# Patient Record
Sex: MALE | Race: BLACK OR AFRICAN AMERICAN | NOT HISPANIC OR LATINO | URBAN - METROPOLITAN AREA
[De-identification: names, ages, dates, MRNs, and addresses within clinical notes are randomized per-mention and may not be internally consistent; named-entity substitution may affect disease eponyms.]

---

## 2020-07-25 ENCOUNTER — TELEPHONE ENCOUNTER (OUTPATIENT)
Dept: URBAN - METROPOLITAN AREA CLINIC 13 | Facility: CLINIC | Age: 60
End: 2020-07-25

## 2020-07-25 RX ORDER — POLYETHYLENE GLYCOL 3350, SODIUM CHLORIDE, SODIUM BICARBONATE AND POTASSIUM CHLORIDE WITH LEMON FLAVOR 420; 11.2; 5.72; 1.48 G/4L; G/4L; G/4L; G/4L
TAKE 1/2 GALLON AT 5:00 PM DAY BEFORE PROCEDURE, TAKE SECOND 1/2 OF GALLON 6 HRS PRIOR TO PROCEDURE POWDER, FOR SOLUTION ORAL
Qty: 1 | Refills: 0 | OUTPATIENT
Start: 2018-02-23 | End: 2018-03-16

## 2020-07-26 ENCOUNTER — TELEPHONE ENCOUNTER (OUTPATIENT)
Dept: URBAN - METROPOLITAN AREA CLINIC 13 | Facility: CLINIC | Age: 60
End: 2020-07-26

## 2020-07-26 RX ORDER — ASPIRIN 81 MG/1
TAKE 1 TABLET DAILY TABLET ORAL
Refills: 0 | Status: ACTIVE | COMMUNITY
Start: 2018-02-23

## 2020-07-26 RX ORDER — METFORMIN HYDROCHLORIDE 500 MG/1
TAKE 1 TABLET TWICE DAILY TABLET, COATED ORAL
Refills: 0 | Status: ACTIVE | COMMUNITY
Start: 2018-02-23

## 2020-07-26 RX ORDER — TADALAFIL 5 MG/1
TAKE 1 TABLET DAILY 1 HOUR BEFORE NEEDED TABLET, FILM COATED ORAL
Refills: 0 | Status: ACTIVE | COMMUNITY
Start: 2018-02-23

## 2020-07-26 RX ORDER — ATORVASTATIN CALCIUM 10 MG/1
TAKE 1 TABLET DAILY TABLET, FILM COATED ORAL
Refills: 0 | Status: ACTIVE | COMMUNITY
Start: 2018-02-23

## 2020-07-26 RX ORDER — LISINOPRIL 10 MG/1
TAKE 1 TABLET DAILY TABLET ORAL
Refills: 0 | Status: ACTIVE | COMMUNITY
Start: 2018-02-23

## 2021-10-21 ENCOUNTER — OFFICE VISIT (OUTPATIENT)
Dept: URBAN - METROPOLITAN AREA CLINIC 107 | Facility: CLINIC | Age: 61
End: 2021-10-21
Payer: COMMERCIAL

## 2021-10-21 ENCOUNTER — WEB ENCOUNTER (OUTPATIENT)
Dept: URBAN - METROPOLITAN AREA CLINIC 107 | Facility: CLINIC | Age: 61
End: 2021-10-21

## 2021-10-21 VITALS
WEIGHT: 211 LBS | BODY MASS INDEX: 31.25 KG/M2 | HEIGHT: 69 IN | TEMPERATURE: 98.2 F | HEART RATE: 86 BPM | RESPIRATION RATE: 18 BRPM | DIASTOLIC BLOOD PRESSURE: 78 MMHG | SYSTOLIC BLOOD PRESSURE: 129 MMHG

## 2021-10-21 DIAGNOSIS — R14.0 ABDOMINAL BLOATING: ICD-10-CM

## 2021-10-21 DIAGNOSIS — R14.3 EXCESSIVE GAS: ICD-10-CM

## 2021-10-21 DIAGNOSIS — R11.0 NAUSEA: ICD-10-CM

## 2021-10-21 DIAGNOSIS — R14.2 ERUCTATION: ICD-10-CM

## 2021-10-21 PROCEDURE — 99204 OFFICE O/P NEW MOD 45 MIN: CPT | Performed by: PHYSICIAN ASSISTANT

## 2021-10-21 RX ORDER — ASPIRIN 81 MG/1
TAKE 1 TABLET DAILY TABLET ORAL
Refills: 0 | Status: ACTIVE | COMMUNITY
Start: 2018-02-23

## 2021-10-21 RX ORDER — ATORVASTATIN CALCIUM 10 MG/1
TAKE 1 TABLET DAILY TABLET, FILM COATED ORAL
Refills: 0 | Status: ACTIVE | COMMUNITY
Start: 2018-02-23

## 2021-10-21 RX ORDER — LISINOPRIL 10 MG/1
TAKE 1 TABLET DAILY TABLET ORAL
Refills: 0 | Status: ACTIVE | COMMUNITY
Start: 2018-02-23

## 2021-10-21 RX ORDER — CARVEDILOL 6.25 MG/1
1 TABLET WITH FOOD TABLET, FILM COATED ORAL TWICE A DAY
Status: ACTIVE | COMMUNITY

## 2021-10-21 RX ORDER — METFORMIN HYDROCHLORIDE 500 MG/1
TAKE 1 TABLET TWICE DAILY TABLET, COATED ORAL
Refills: 0 | Status: ON HOLD | COMMUNITY
Start: 2018-02-23

## 2021-10-21 RX ORDER — TADALAFIL 5 MG/1
TAKE 1 TABLET DAILY 1 HOUR BEFORE NEEDED TABLET, FILM COATED ORAL
Refills: 0 | Status: ON HOLD | COMMUNITY
Start: 2018-02-23

## 2021-10-21 NOTE — HPI-TODAY'S VISIT:
Mr. Wharton is a 61-year-old gentleman with a history of adenomatous colon polyps, hypertension and hyperlipidemia, presenting with complaints of excessive gas, bloating, nausea and belching. He was last seen in 2018 for follow-up after undergoing colonoscopy.  This exam performed on 3/16/2018 was notable for the removal of 2 tubular adenomas which were less than 1 cm.  Repeat colonoscopy is recommended in 2023.  Hemorrhoid banding was also discussed at that time should his hemorrhoids become symptomatic. For the past month he reports persistent issues with belching and infrequent  bouts of nausea. He reportedly had a negative H. pylori breath test performed by his primary care provider.  His symptoms are typically exacerbated with drinking grape juice and carbonated sodas. He also endorses abdominal bloating and early satiety which have improved with dietary modifications and Gas-X as needed. Lately, he has become more aware of his dietary habits and is currently trying to identify foods that could exacerbating his symptoms.  Denies any vomiting, fevers or chills.  He states he had labs performed by his PCP about a month ago.  Unfortunately, we do not have these for review.  He reports a 15 pound intentional weight loss.  He denies any changes in his bowel habits.  No red blood per rectum, melena or hematochezia.  Denies any pain with defecation.

## 2021-11-19 ENCOUNTER — WEB ENCOUNTER (OUTPATIENT)
Dept: URBAN - METROPOLITAN AREA CLINIC 107 | Facility: CLINIC | Age: 61
End: 2021-11-19

## 2021-11-19 ENCOUNTER — OFFICE VISIT (OUTPATIENT)
Dept: URBAN - METROPOLITAN AREA CLINIC 107 | Facility: CLINIC | Age: 61
End: 2021-11-19
Payer: COMMERCIAL

## 2021-11-19 VITALS
SYSTOLIC BLOOD PRESSURE: 151 MMHG | BODY MASS INDEX: 30.96 KG/M2 | HEART RATE: 77 BPM | DIASTOLIC BLOOD PRESSURE: 91 MMHG | TEMPERATURE: 98.5 F | RESPIRATION RATE: 18 BRPM | WEIGHT: 209 LBS | HEIGHT: 69 IN

## 2021-11-19 DIAGNOSIS — R14.2 ERUCTATION: ICD-10-CM

## 2021-11-19 DIAGNOSIS — R11.0 NAUSEA: ICD-10-CM

## 2021-11-19 DIAGNOSIS — R14.0 ABDOMINAL BLOATING: ICD-10-CM

## 2021-11-19 DIAGNOSIS — R14.3 EXCESSIVE GAS: ICD-10-CM

## 2021-11-19 DIAGNOSIS — Z86.010 HISTORY OF COLON POLYPS: ICD-10-CM

## 2021-11-19 PROBLEM — 428283002: Status: ACTIVE | Noted: 2021-11-19

## 2021-11-19 PROCEDURE — 99213 OFFICE O/P EST LOW 20 MIN: CPT | Performed by: PHYSICIAN ASSISTANT

## 2021-11-19 RX ORDER — TADALAFIL 5 MG/1
TAKE 1 TABLET DAILY 1 HOUR BEFORE NEEDED TABLET, FILM COATED ORAL
Refills: 0 | Status: ON HOLD | COMMUNITY
Start: 2018-02-23

## 2021-11-19 RX ORDER — CARVEDILOL 6.25 MG/1
1 TABLET WITH FOOD TABLET, FILM COATED ORAL TWICE A DAY
Status: ACTIVE | COMMUNITY

## 2021-11-19 RX ORDER — ATORVASTATIN CALCIUM 10 MG/1
TAKE 1 TABLET DAILY TABLET, FILM COATED ORAL
Refills: 0 | Status: ACTIVE | COMMUNITY
Start: 2018-02-23

## 2021-11-19 RX ORDER — ASPIRIN 81 MG/1
TAKE 1 TABLET DAILY TABLET ORAL
Refills: 0 | Status: ACTIVE | COMMUNITY
Start: 2018-02-23

## 2021-11-19 RX ORDER — LISINOPRIL 10 MG/1
TAKE 1 TABLET DAILY TABLET ORAL
Refills: 0 | Status: ACTIVE | COMMUNITY
Start: 2018-02-23

## 2021-11-19 RX ORDER — METFORMIN HYDROCHLORIDE 500 MG/1
TAKE 1 TABLET TWICE DAILY TABLET, COATED ORAL
Refills: 0 | Status: ON HOLD | COMMUNITY
Start: 2018-02-23

## 2021-11-19 NOTE — HPI-OTHER HISTORIES
Colonoscopy (3/16/2018): [place] bowel preparation scale score of 9.  One 4 mm polyp in the descending colon.  Diverticulosis in the entire examined colon.  Nodular ileal mucosa.  Pathology demonstrated this polyp to be a tubular adenoma.  Terminal ileum biopsies revealed prominent mucosal lymphoid aggregates.

## 2022-05-06 ENCOUNTER — TELEPHONE ENCOUNTER (OUTPATIENT)
Dept: URBAN - METROPOLITAN AREA CLINIC 113 | Facility: CLINIC | Age: 62
End: 2022-05-06

## 2022-05-24 ENCOUNTER — OFFICE VISIT (OUTPATIENT)
Dept: URBAN - METROPOLITAN AREA SURGERY CENTER 25 | Facility: SURGERY CENTER | Age: 62
End: 2022-05-24

## 2023-02-08 ENCOUNTER — OFFICE VISIT (OUTPATIENT)
Dept: URBAN - METROPOLITAN AREA CLINIC 107 | Facility: CLINIC | Age: 63
End: 2023-02-08
Payer: COMMERCIAL

## 2023-02-08 VITALS
SYSTOLIC BLOOD PRESSURE: 146 MMHG | BODY MASS INDEX: 29.18 KG/M2 | TEMPERATURE: 97.8 F | DIASTOLIC BLOOD PRESSURE: 88 MMHG | HEIGHT: 69 IN | WEIGHT: 197 LBS | HEART RATE: 64 BPM

## 2023-02-08 DIAGNOSIS — R63.4 WEIGHT LOSS, UNINTENTIONAL: ICD-10-CM

## 2023-02-08 DIAGNOSIS — R19.4 CHANGE IN BOWEL HABITS: ICD-10-CM

## 2023-02-08 DIAGNOSIS — Z86.010 HISTORY OF ADENOMATOUS POLYP OF COLON: ICD-10-CM

## 2023-02-08 DIAGNOSIS — R19.7 DIARRHEA: ICD-10-CM

## 2023-02-08 PROCEDURE — 99214 OFFICE O/P EST MOD 30 MIN: CPT | Performed by: NURSE PRACTITIONER

## 2023-02-08 RX ORDER — CARVEDILOL 6.25 MG/1
1 TABLET WITH FOOD TABLET, FILM COATED ORAL TWICE A DAY
Status: ACTIVE | COMMUNITY

## 2023-02-08 RX ORDER — ATORVASTATIN CALCIUM 10 MG/1
TAKE 1 TABLET DAILY TABLET, FILM COATED ORAL
Refills: 0 | Status: ACTIVE | COMMUNITY
Start: 2018-02-23

## 2023-02-08 RX ORDER — LISINOPRIL 10 MG/1
TAKE 1 TABLET DAILY TABLET ORAL
Refills: 0 | Status: ACTIVE | COMMUNITY
Start: 2018-02-23

## 2023-02-08 RX ORDER — SODIUM, POTASSIUM,MAG SULFATES 17.5-3.13G
177ML AS DIRECTED SOLUTION, RECONSTITUTED, ORAL ORAL
Qty: 1 KIT | Refills: 0 | OUTPATIENT
Start: 2023-02-08 | End: 2023-02-10

## 2023-02-08 NOTE — HPI-OTHER HISTORIES
Colonoscopy (3/16/2018): [place] bowel preparation scale score of 9.  One 4 mm polyp in the descending colon.  Diverticulosis in the entire examined colon.  Nodular ileal mucosa.  Pathology demonstrated this polyp to be a tubular adenoma.  Terminal ileum biopsies revealed prominent mucosal lymphoid aggregates.

## 2023-02-10 PROBLEM — 429047008 HISTORY OF ADENOMATOUS POLYP OF COLON: Status: ACTIVE | Noted: 2023-02-10

## 2023-02-13 ENCOUNTER — WEB ENCOUNTER (OUTPATIENT)
Dept: URBAN - METROPOLITAN AREA CLINIC 113 | Facility: CLINIC | Age: 63
End: 2023-02-13

## 2023-02-17 ENCOUNTER — CLAIMS CREATED FROM THE CLAIM WINDOW (OUTPATIENT)
Dept: URBAN - METROPOLITAN AREA MEDICAL CENTER 43 | Facility: MEDICAL CENTER | Age: 63
End: 2023-02-17
Payer: COMMERCIAL

## 2023-02-17 ENCOUNTER — CLAIMS CREATED FROM THE CLAIM WINDOW (OUTPATIENT)
Dept: URBAN - METROPOLITAN AREA MEDICAL CENTER 43 | Facility: MEDICAL CENTER | Age: 63
End: 2023-02-17

## 2023-02-17 DIAGNOSIS — K51.011 ULCERATIVE PANCOLITIS WITH RECTAL BLEEDING: ICD-10-CM

## 2023-02-17 DIAGNOSIS — K63.3 APHTHOUS ULCER OF COLON: ICD-10-CM

## 2023-02-17 DIAGNOSIS — K57.30 ACQUIRED DIVERTICULOSIS OF COLON: ICD-10-CM

## 2023-02-17 DIAGNOSIS — K52.89 OTHER SPECIFIED NONINFECTIVE GASTROENTERITIS AND COLITIS: ICD-10-CM

## 2023-02-17 DIAGNOSIS — K64.1 BLEEDING GRADE II HEMORRHOIDS: ICD-10-CM

## 2023-02-17 DIAGNOSIS — K92.1 HEMATOCHEZIA: ICD-10-CM

## 2023-02-17 DIAGNOSIS — R19.7 DIARRHEA: ICD-10-CM

## 2023-02-17 PROBLEM — 444548001: Status: ACTIVE | Noted: 2023-02-17

## 2023-02-17 PROCEDURE — 45380 COLONOSCOPY AND BIOPSY: CPT | Performed by: INTERNAL MEDICINE

## 2023-02-17 RX ORDER — LISINOPRIL 10 MG/1
TAKE 1 TABLET DAILY TABLET ORAL
Refills: 0 | Status: ACTIVE | COMMUNITY
Start: 2018-02-23

## 2023-02-17 RX ORDER — ATORVASTATIN CALCIUM 10 MG/1
TAKE 1 TABLET DAILY TABLET, FILM COATED ORAL
Refills: 0 | Status: ACTIVE | COMMUNITY
Start: 2018-02-23

## 2023-02-17 RX ORDER — MESALAMINE 1.2 G/1
4 CAPSULES TABLET, DELAYED RELEASE ORAL ONCE A DAY
Qty: 360 CAPSULE | Refills: 1 | OUTPATIENT
Start: 2023-02-17 | End: 2023-08-16

## 2023-02-17 RX ORDER — CARVEDILOL 6.25 MG/1
1 TABLET WITH FOOD TABLET, FILM COATED ORAL TWICE A DAY
Status: ACTIVE | COMMUNITY

## 2023-02-20 ENCOUNTER — TELEPHONE ENCOUNTER (OUTPATIENT)
Dept: URBAN - METROPOLITAN AREA CLINIC 107 | Facility: CLINIC | Age: 63
End: 2023-02-20

## 2023-02-20 RX ORDER — PREDNISONE 10 MG/1
AS DIRECTED TABLET ORAL ONCE A DAY
Qty: 150 | Refills: 0 | OUTPATIENT
Start: 2023-02-21 | End: 2023-03-23

## 2023-02-22 ENCOUNTER — OFFICE VISIT (OUTPATIENT)
Dept: URBAN - METROPOLITAN AREA MEDICAL CENTER 2 | Facility: MEDICAL CENTER | Age: 63
End: 2023-02-22

## 2023-02-23 ENCOUNTER — OFFICE VISIT (OUTPATIENT)
Dept: URBAN - METROPOLITAN AREA MEDICAL CENTER 43 | Facility: MEDICAL CENTER | Age: 63
End: 2023-02-23

## 2023-03-06 PROBLEM — 398050005 DIVERTICULAR DISEASE OF COLON: Status: ACTIVE | Noted: 2023-03-06

## 2023-03-20 ENCOUNTER — OFFICE VISIT (OUTPATIENT)
Dept: URBAN - METROPOLITAN AREA CLINIC 107 | Facility: CLINIC | Age: 63
End: 2023-03-20
Payer: COMMERCIAL

## 2023-03-20 VITALS
SYSTOLIC BLOOD PRESSURE: 128 MMHG | WEIGHT: 193 LBS | HEART RATE: 81 BPM | TEMPERATURE: 96.7 F | BODY MASS INDEX: 28.58 KG/M2 | DIASTOLIC BLOOD PRESSURE: 74 MMHG | HEIGHT: 69 IN

## 2023-03-20 DIAGNOSIS — R19.7 DIARRHEA: ICD-10-CM

## 2023-03-20 DIAGNOSIS — R63.4 WEIGHT LOSS, UNINTENTIONAL: ICD-10-CM

## 2023-03-20 DIAGNOSIS — Z86.010 HISTORY OF ADENOMATOUS POLYP OF COLON: ICD-10-CM

## 2023-03-20 DIAGNOSIS — R14.2 ERUCTATION: ICD-10-CM

## 2023-03-20 DIAGNOSIS — R14.3 EXCESSIVE GAS: ICD-10-CM

## 2023-03-20 DIAGNOSIS — R14.0 ABDOMINAL BLOATING: ICD-10-CM

## 2023-03-20 DIAGNOSIS — K51.011 ULCERATIVE PANCOLITIS WITH RECTAL BLEEDING: ICD-10-CM

## 2023-03-20 DIAGNOSIS — R11.0 NAUSEA: ICD-10-CM

## 2023-03-20 PROCEDURE — 99214 OFFICE O/P EST MOD 30 MIN: CPT | Performed by: INTERNAL MEDICINE

## 2023-03-20 RX ORDER — CARVEDILOL 6.25 MG/1
1 TABLET WITH FOOD TABLET, FILM COATED ORAL TWICE A DAY
Status: ACTIVE | COMMUNITY

## 2023-03-20 RX ORDER — MESALAMINE 1.2 G/1
4 CAPSULES TABLET, DELAYED RELEASE ORAL ONCE A DAY
Qty: 360 CAPSULE | Refills: 1 | Status: ON HOLD | COMMUNITY
Start: 2023-02-17 | End: 2023-08-16

## 2023-03-20 RX ORDER — LISINOPRIL 10 MG/1
TAKE 1 TABLET DAILY TABLET ORAL
Refills: 0 | Status: ACTIVE | COMMUNITY
Start: 2018-02-23

## 2023-03-20 RX ORDER — ATORVASTATIN CALCIUM 10 MG/1
TAKE 1 TABLET DAILY TABLET, FILM COATED ORAL
Refills: 0 | Status: ACTIVE | COMMUNITY
Start: 2018-02-23

## 2023-03-20 RX ORDER — PREDNISONE 10 MG/1
AS DIRECTED TABLET ORAL ONCE A DAY
Qty: 150 | Refills: 0 | Status: ACTIVE | COMMUNITY
Start: 2023-02-21 | End: 2023-03-23

## 2023-03-20 NOTE — HPI-TODAY'S VISIT:
64 y/o male presenting for f/u. He was last seen in Feb 2023 and subsequently had a CSC performed in Feb 2023. He was found to have internal hemorrhoids and diverticula. He also had bleeding mucosa in the entire colon. 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 He was started on mesalamine but could not swallow these pills. He was then started on a short steroid taper. CT chest/abd/pelvis was unremarkable.   He still has diarrhea; which is intermittent. It occurs 1-5 per day. Sometimes it is more. Sometimes it may be 5-8 per day. It is better while on the steroids. He denies any rectal bleeding.   2/8/23 Mr. Wharton is a 63-year-old gentleman with a history of adenomatous colon polyps, hypertension, and hyperlipidemia, presenting to discuss colonoscopy. He was seen in the office in November 2021 for follow-up regarding abdominal bloating, nausea, excess gas and belching suspected to be related to functional dyspepsia.  His symptoms had improved with dietary modification and FDgard.  A colonoscopy was recommended for polyp surveillance in 2023. He had COVID-19 around [place]. Since this time, his bowel habits have changed. He complains of early satiety, stating he becomes full after eating one bite. He has unintentionally lost over 20lb in the last 6-8 weeks. He denies abdominal pain, but states anything he eats is running right through him. He has diarrhea all throughout the day with up to 6 episodes of nocturnal defecation which wakes him from sleep. He denies blood in the stool. At times, he will experience the urge to have a bowel movement without response. He has occasional nausea without vomiting. He had labs two weeks ago with his PCP. He has not had any stool studies or abdominal imaging.

## 2023-03-20 NOTE — HPI-OTHER HISTORIES
Colonoscopy (3/16/2018): [place] bowel preparation scale score of 9.  One 4 mm polyp in the descending colon.  Diverticulosis in the entire examined colon.  Nodular ileal mucosa.  Pathology demonstrated this polyp to be a tubular adenoma.  Terminal ileum biopsies revealed prominent mucosal lymphoid aggregates.

## 2023-04-04 ENCOUNTER — TELEPHONE ENCOUNTER (OUTPATIENT)
Dept: URBAN - METROPOLITAN AREA CLINIC 107 | Facility: CLINIC | Age: 63
End: 2023-04-04

## 2023-04-04 ENCOUNTER — OFFICE VISIT (OUTPATIENT)
Dept: URBAN - METROPOLITAN AREA CLINIC 107 | Facility: CLINIC | Age: 63
End: 2023-04-04

## 2023-04-04 ENCOUNTER — WEB ENCOUNTER (OUTPATIENT)
Dept: URBAN - METROPOLITAN AREA CLINIC 113 | Facility: CLINIC | Age: 63
End: 2023-04-04

## 2023-04-20 ENCOUNTER — OFFICE VISIT (OUTPATIENT)
Dept: URBAN - METROPOLITAN AREA MEDICAL CENTER 2 | Facility: MEDICAL CENTER | Age: 63
End: 2023-04-20
Payer: COMMERCIAL

## 2023-04-20 DIAGNOSIS — K22.2 ACQUIRED ESOPHAGEAL RING: ICD-10-CM

## 2023-04-20 DIAGNOSIS — K29.60 ADENOPAPILLOMATOSIS GASTRICA: ICD-10-CM

## 2023-04-20 DIAGNOSIS — K20.80 ESOPHAGITIS DISSECANS SUPERFICIALIS: ICD-10-CM

## 2023-04-20 PROCEDURE — 43239 EGD BIOPSY SINGLE/MULTIPLE: CPT | Performed by: INTERNAL MEDICINE

## 2023-04-20 RX ORDER — MESALAMINE 1.2 G/1
4 CAPSULES TABLET, DELAYED RELEASE ORAL ONCE A DAY
Qty: 360 CAPSULE | Refills: 1 | Status: ON HOLD | COMMUNITY
Start: 2023-02-17 | End: 2023-08-16

## 2023-04-20 RX ORDER — ATORVASTATIN CALCIUM 10 MG/1
TAKE 1 TABLET DAILY TABLET, FILM COATED ORAL
Refills: 0 | Status: ACTIVE | COMMUNITY
Start: 2018-02-23

## 2023-04-20 RX ORDER — LISINOPRIL 10 MG/1
TAKE 1 TABLET DAILY TABLET ORAL
Refills: 0 | Status: ACTIVE | COMMUNITY
Start: 2018-02-23

## 2023-04-20 RX ORDER — CARVEDILOL 6.25 MG/1
1 TABLET WITH FOOD TABLET, FILM COATED ORAL TWICE A DAY
Status: ACTIVE | COMMUNITY

## 2023-05-09 ENCOUNTER — TELEPHONE ENCOUNTER (OUTPATIENT)
Dept: URBAN - METROPOLITAN AREA CLINIC 107 | Facility: CLINIC | Age: 63
End: 2023-05-09

## 2023-05-15 ENCOUNTER — OFFICE VISIT (OUTPATIENT)
Dept: URBAN - METROPOLITAN AREA CLINIC 107 | Facility: CLINIC | Age: 63
End: 2023-05-15
Payer: COMMERCIAL

## 2023-05-15 VITALS
TEMPERATURE: 97.3 F | HEIGHT: 69 IN | WEIGHT: 194.8 LBS | HEART RATE: 86 BPM | RESPIRATION RATE: 18 BRPM | BODY MASS INDEX: 28.85 KG/M2 | SYSTOLIC BLOOD PRESSURE: 103 MMHG | DIASTOLIC BLOOD PRESSURE: 62 MMHG

## 2023-05-15 DIAGNOSIS — R19.7 DIARRHEA: ICD-10-CM

## 2023-05-15 DIAGNOSIS — Z86.010 HISTORY OF COLON POLYPS: ICD-10-CM

## 2023-05-15 DIAGNOSIS — K51.011 ULCERATIVE PANCOLITIS WITH RECTAL BLEEDING: ICD-10-CM

## 2023-05-15 DIAGNOSIS — R11.0 NAUSEA: ICD-10-CM

## 2023-05-15 PROCEDURE — 99214 OFFICE O/P EST MOD 30 MIN: CPT | Performed by: INTERNAL MEDICINE

## 2023-05-15 RX ORDER — ATORVASTATIN CALCIUM 10 MG/1
TAKE 1 TABLET DAILY TABLET, FILM COATED ORAL
Refills: 0 | Status: ACTIVE | COMMUNITY
Start: 2018-02-23

## 2023-05-15 RX ORDER — SODIUM, POTASSIUM,MAG SULFATES 17.5-3.13G
177ML SOLUTION, RECONSTITUTED, ORAL ORAL
Qty: 1 | OUTPATIENT
Start: 2023-05-15 | End: 2023-05-17

## 2023-05-15 RX ORDER — MESALAMINE 1.2 G/1
4 CAPSULES TABLET, DELAYED RELEASE ORAL ONCE A DAY
Qty: 360 CAPSULE | Refills: 1 | Status: ON HOLD | COMMUNITY
Start: 2023-02-17 | End: 2023-08-16

## 2023-05-15 RX ORDER — CARVEDILOL 6.25 MG/1
1 TABLET WITH FOOD TABLET, FILM COATED ORAL TWICE A DAY
Status: ACTIVE | COMMUNITY

## 2023-05-15 RX ORDER — LISINOPRIL 10 MG/1
TAKE 1 TABLET DAILY TABLET ORAL
Refills: 0 | Status: ACTIVE | COMMUNITY
Start: 2018-02-23

## 2023-05-15 NOTE — HPI-TODAY'S VISIT:
64 y/o male presenting for f/u. He was last seen in March 2023. He has a hx of dysphgia/GERD and was taken for an EGD. He was found to have LA-A esophagitis and a mild Schatzki's ring. Gastric bx were significant for chronic gastritis and negative for H.Pylori. esophageal bx were negative for BE.  Colitis on colonoscopy. Recommended to continue steroid taper. Will consider repeating his colonoscopy to demonstrate resolution of his colitis. He did finish steroid taper. He still has some loose stool. No rectal bleeding. He is not on any other medications currently; he could not tolerate swallowing them. He denies any rectal bleeding.   3/20/23 64 y/o male presenting for f/u. He was last seen in Feb 2023 and subsequently had a CSC performed in Feb 2023. He was found to have internal hemorrhoids and diverticula. He also had bleeding mucosa in the entire colon. 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 He was started on mesalamine but could not swallow these pills. He was then started on a short steroid taper. CT chest/abd/pelvis was unremarkable.   He still has diarrhea; which is intermittent. It occurs 1-5 per day. Sometimes it is more. Sometimes it may be 5-8 per day. It is better while on the steroids. He denies any rectal bleeding.   2/8/23 Mr. Wharton is a 63-year-old gentleman with a history of adenomatous colon polyps, hypertension, and hyperlipidemia, presenting to discuss colonoscopy. He was seen in the office in November 2021 for follow-up regarding abdominal bloating, nausea, excess gas and belching suspected to be related to functional dyspepsia.  His symptoms had improved with dietary modification and FDgard.  A colonoscopy was recommended for polyp surveillance in 2023. He had COVID-19 around [place]. Since this time, his bowel habits have changed. He complains of early satiety, stating he becomes full after eating one bite. He has unintentionally lost over 20lb in the last 6-8 weeks. He denies abdominal pain, but states anything he eats is running right through him. He has diarrhea all throughout the day with up to 6 episodes of nocturnal defecation which wakes him from sleep. He denies blood in the stool. At times, he will experience the urge to have a bowel movement without response. He has occasional nausea without vomiting. He had labs two weeks ago with his PCP. He has not had any stool studies or abdominal imaging.

## 2023-05-15 NOTE — HPI-OTHER HISTORIES
Colonoscopy (3/16/2018): [place] bowel preparation scale score of 9.  One 4 mm polyp in the descending colon.  Diverticulosis in the entire examined colon.  Nodular ileal mucosa.  Pathology demonstrated this polyp to be a tubular adenoma.  Terminal ileum biopsies revealed prominent mucosal lymphoid aggregates.

## 2023-07-07 ENCOUNTER — CLAIMS CREATED FROM THE CLAIM WINDOW (OUTPATIENT)
Dept: URBAN - METROPOLITAN AREA MEDICAL CENTER 43 | Facility: MEDICAL CENTER | Age: 63
End: 2023-07-07
Payer: COMMERCIAL

## 2023-07-07 ENCOUNTER — OFFICE VISIT (OUTPATIENT)
Dept: URBAN - METROPOLITAN AREA MEDICAL CENTER 43 | Facility: MEDICAL CENTER | Age: 63
End: 2023-07-07

## 2023-07-07 DIAGNOSIS — K64.1 GRADE II HEMORRHOIDS: ICD-10-CM

## 2023-07-07 DIAGNOSIS — K51.50 LEFT SIDED COLITIS: ICD-10-CM

## 2023-07-07 DIAGNOSIS — K57.30 ACQUIRED DIVERTICULOSIS OF COLON: ICD-10-CM

## 2023-07-07 PROCEDURE — 45380 COLONOSCOPY AND BIOPSY: CPT | Performed by: INTERNAL MEDICINE

## 2023-07-07 RX ORDER — ATORVASTATIN CALCIUM 10 MG/1
TAKE 1 TABLET DAILY TABLET, FILM COATED ORAL
Refills: 0 | Status: ACTIVE | COMMUNITY
Start: 2018-02-23

## 2023-07-07 RX ORDER — CARVEDILOL 6.25 MG/1
1 TABLET WITH FOOD TABLET, FILM COATED ORAL TWICE A DAY
Status: ACTIVE | COMMUNITY

## 2023-07-07 RX ORDER — LISINOPRIL 10 MG/1
TAKE 1 TABLET DAILY TABLET ORAL
Refills: 0 | Status: ACTIVE | COMMUNITY
Start: 2018-02-23

## 2023-07-07 RX ORDER — MESALAMINE 1.2 G/1
4 CAPSULES TABLET, DELAYED RELEASE ORAL ONCE A DAY
Qty: 360 CAPSULE | Refills: 1 | Status: ON HOLD | COMMUNITY
Start: 2023-02-17 | End: 2023-08-16

## 2023-08-18 ENCOUNTER — ERX REFILL RESPONSE (OUTPATIENT)
Dept: URBAN - METROPOLITAN AREA CLINIC 107 | Facility: CLINIC | Age: 63
End: 2023-08-18

## 2023-08-18 RX ORDER — MESALAMINE 1.2 G/1
TAKE 4 CAPSULES ORALLY ONCE A DAY FOR 90 DAYS TABLET, DELAYED RELEASE ORAL
Qty: 360 TABLET | Refills: 1 | OUTPATIENT

## 2023-08-18 RX ORDER — MESALAMINE 1.2 G/1
TAKE 4 CAPSULES ORALLY ONCE A DAY FOR 90 DAYS TABLET, DELAYED RELEASE ORAL
Qty: 360 TABLET | Refills: 2 | OUTPATIENT

## 2023-09-27 ENCOUNTER — OFFICE VISIT (OUTPATIENT)
Dept: URBAN - METROPOLITAN AREA CLINIC 107 | Facility: CLINIC | Age: 63
End: 2023-09-27
Payer: COMMERCIAL

## 2023-09-27 VITALS
SYSTOLIC BLOOD PRESSURE: 189 MMHG | DIASTOLIC BLOOD PRESSURE: 104 MMHG | HEIGHT: 69 IN | TEMPERATURE: 97.2 F | WEIGHT: 194 LBS | HEART RATE: 87 BPM | RESPIRATION RATE: 18 BRPM | BODY MASS INDEX: 28.73 KG/M2

## 2023-09-27 DIAGNOSIS — Z86.010 HISTORY OF ADENOMATOUS POLYP OF COLON: ICD-10-CM

## 2023-09-27 DIAGNOSIS — R14.0 ABDOMINAL BLOATING: ICD-10-CM

## 2023-09-27 DIAGNOSIS — R63.4 WEIGHT LOSS, UNINTENTIONAL: ICD-10-CM

## 2023-09-27 DIAGNOSIS — R19.7 DIARRHEA: ICD-10-CM

## 2023-09-27 DIAGNOSIS — R14.3 EXCESSIVE GAS: ICD-10-CM

## 2023-09-27 DIAGNOSIS — R14.2 ERUCTATION: ICD-10-CM

## 2023-09-27 DIAGNOSIS — R19.4 CHANGE IN BOWEL HABITS: ICD-10-CM

## 2023-09-27 DIAGNOSIS — R11.0 NAUSEA: ICD-10-CM

## 2023-09-27 DIAGNOSIS — K51.011 ULCERATIVE PANCOLITIS WITH RECTAL BLEEDING: ICD-10-CM

## 2023-09-27 PROCEDURE — 99213 OFFICE O/P EST LOW 20 MIN: CPT | Performed by: INTERNAL MEDICINE

## 2023-09-27 RX ORDER — ATORVASTATIN CALCIUM 10 MG/1
TAKE 1 TABLET DAILY TABLET, FILM COATED ORAL
Refills: 0 | Status: ACTIVE | COMMUNITY
Start: 2018-02-23

## 2023-09-27 RX ORDER — CARVEDILOL 6.25 MG/1
1 TABLET WITH FOOD TABLET, FILM COATED ORAL TWICE A DAY
Status: ACTIVE | COMMUNITY

## 2023-09-27 RX ORDER — MESALAMINE 1.2 G/1
TAKE 4 CAPSULES ORALLY ONCE A DAY FOR 90 DAYS TABLET, DELAYED RELEASE ORAL
Qty: 360 TABLET | Refills: 1 | Status: ACTIVE | COMMUNITY

## 2023-09-27 RX ORDER — LISINOPRIL 10 MG/1
TAKE 1 TABLET DAILY TABLET ORAL
Refills: 0 | Status: ACTIVE | COMMUNITY
Start: 2018-02-23

## 2023-09-27 NOTE — HPI-TODAY'S VISIT:
62 y/o male presenting for f/u. He was last seen in May 2023. He has a hx of GERD and esophagitis. He had a CSC and was found to have colitis. WE recommended to repeat his CSC which was done in July 2023. Random bx were negative and his CSC was normal without any colitis. He denies any rectal bleeding or pain. He does have intermittent diarrhea which occurrs with certain foods. It may happen once weekly. He has more good days than bad.   5/15/23 62 y/o male presenting for f/u. He was last seen in March 2023. He has a hx of dysphgia/GERD and was taken for an EGD. He was found to have LA-A esophagitis and a mild Schatzki's ring. Gastric bx were significant for chronic gastritis and negative for H.Pylori. esophageal bx were negative for BE.  Colitis on colonoscopy. Recommended to continue steroid taper. Will consider repeating his colonoscopy to demonstrate resolution of his colitis. He did finish steroid taper. He still has some loose stool. No rectal bleeding. He is not on any other medications currently; he could not tolerate swallowing them. He denies any rectal bleeding.   3/20/23 62 y/o male presenting for f/u. He was last seen in Feb 2023 and subsequently had a CSC performed in Feb 2023. He was found to have internal hemorrhoids and diverticula. He also had bleeding mucosa in the entire colon. 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 He was started on mesalamine but could not swallow these pills. He was then started on a short steroid taper. CT chest/abd/pelvis was unremarkable.   He still has diarrhea; which is intermittent. It occurs 1-5 per day. Sometimes it is more. Sometimes it may be 5-8 per day. It is better while on the steroids. He denies any rectal bleeding.   2/8/23 Mr. Wharton is a 63-year-old gentleman with a history of adenomatous colon polyps, hypertension, and hyperlipidemia, presenting to discuss colonoscopy. He was seen in the office in November 2021 for follow-up regarding abdominal bloating, nausea, excess gas and belching suspected to be related to functional dyspepsia.  His symptoms had improved with dietary modification and FDgard.  A colonoscopy was recommended for polyp surveillance in 2023. He had COVID-19 around [place]. Since this time, his bowel habits have changed. He complains of early satiety, stating he becomes full after eating one bite. He has unintentionally lost over 20lb in the last 6-8 weeks. He denies abdominal pain, but states anything he eats is running right through him. He has diarrhea all throughout the day with up to 6 episodes of nocturnal defecation which wakes him from sleep. He denies blood in the stool. At times, he will experience the urge to have a bowel movement without response. He has occasional nausea without vomiting. He had labs two weeks ago with his PCP. He has not had any stool studies or abdominal imaging.

## 2023-09-27 NOTE — HPI-OTHER HISTORIES
Colonoscopy (3/16/2018): [place] bowel preparation scale score of 9.  One 4 mm polyp in the descending colon.  Diverticulosis in the entire examined colon.  Nodular ileal mucosa.  Pathology demonstrated this polyp to be a tubular adenoma.  Terminal ileum biopsies revealed prominent mucosal lymphoid aggregates.

## 2024-02-14 ENCOUNTER — OV EP (OUTPATIENT)
Dept: URBAN - METROPOLITAN AREA CLINIC 113 | Facility: CLINIC | Age: 64
End: 2024-02-14

## 2024-02-14 RX ORDER — CARVEDILOL 6.25 MG/1
1 TABLET WITH FOOD TABLET, FILM COATED ORAL TWICE A DAY
Status: ACTIVE | COMMUNITY

## 2024-02-14 RX ORDER — LISINOPRIL 10 MG/1
TAKE 1 TABLET DAILY TABLET ORAL
Refills: 0 | Status: ACTIVE | COMMUNITY
Start: 2018-02-23

## 2024-02-14 RX ORDER — MESALAMINE 1.2 G/1
TAKE 4 CAPSULES ORALLY ONCE A DAY FOR 90 DAYS TABLET, DELAYED RELEASE ORAL
Qty: 360 TABLET | Refills: 1 | Status: ACTIVE | COMMUNITY

## 2024-02-14 RX ORDER — ATORVASTATIN CALCIUM 10 MG/1
TAKE 1 TABLET DAILY TABLET, FILM COATED ORAL
Refills: 0 | Status: ACTIVE | COMMUNITY
Start: 2018-02-23

## 2024-03-08 ENCOUNTER — OV EP (OUTPATIENT)
Dept: URBAN - METROPOLITAN AREA CLINIC 107 | Facility: CLINIC | Age: 64
End: 2024-03-08

## 2024-04-12 ENCOUNTER — OV EP (OUTPATIENT)
Dept: URBAN - METROPOLITAN AREA CLINIC 107 | Facility: CLINIC | Age: 64
End: 2024-04-12

## 2024-05-07 ENCOUNTER — CLAIMS CREATED FROM THE CLAIM WINDOW (OUTPATIENT)
Dept: URBAN - METROPOLITAN AREA CLINIC 107 | Facility: CLINIC | Age: 64
End: 2024-05-07

## 2024-05-07 ENCOUNTER — OFFICE VISIT (OUTPATIENT)
Dept: URBAN - METROPOLITAN AREA CLINIC 107 | Facility: CLINIC | Age: 64
End: 2024-05-07
Payer: COMMERCIAL

## 2024-05-07 ENCOUNTER — DASHBOARD ENCOUNTERS (OUTPATIENT)
Age: 64
End: 2024-05-07

## 2024-05-07 VITALS
HEART RATE: 61 BPM | BODY MASS INDEX: 28.88 KG/M2 | SYSTOLIC BLOOD PRESSURE: 136 MMHG | TEMPERATURE: 97.9 F | WEIGHT: 195 LBS | DIASTOLIC BLOOD PRESSURE: 93 MMHG | HEIGHT: 69 IN

## 2024-05-07 DIAGNOSIS — R14.3 EXCESSIVE GAS: ICD-10-CM

## 2024-05-07 DIAGNOSIS — R19.7 INTERMITTENT DIARRHEA: ICD-10-CM

## 2024-05-07 DIAGNOSIS — R14.0 ABDOMINAL BLOATING: ICD-10-CM

## 2024-05-07 DIAGNOSIS — K51.00 ULCERATIVE PANCOLITIS: ICD-10-CM

## 2024-05-07 PROBLEM — 444548001: Status: ACTIVE | Noted: 2024-05-07

## 2024-05-07 PROCEDURE — 99214 OFFICE O/P EST MOD 30 MIN: CPT | Performed by: NURSE PRACTITIONER

## 2024-05-07 RX ORDER — LISINOPRIL 10 MG/1
TAKE 1 TABLET DAILY TABLET ORAL
Refills: 0 | Status: ACTIVE | COMMUNITY
Start: 2018-02-23

## 2024-05-07 RX ORDER — CARVEDILOL 6.25 MG/1
1 TABLET WITH FOOD TABLET, FILM COATED ORAL TWICE A DAY
Status: ACTIVE | COMMUNITY

## 2024-05-07 RX ORDER — MESALAMINE 1.2 G/1
TAKE 4 CAPSULES ORALLY ONCE A DAY FOR 90 DAYS TABLET, DELAYED RELEASE ORAL
Qty: 360 TABLET | Refills: 1 | Status: ON HOLD | COMMUNITY

## 2024-05-07 RX ORDER — ATORVASTATIN CALCIUM 10 MG/1
TAKE 1 TABLET DAILY TABLET, FILM COATED ORAL
Refills: 0 | Status: ACTIVE | COMMUNITY
Start: 2018-02-23

## 2024-08-07 ENCOUNTER — OFFICE VISIT (OUTPATIENT)
Dept: URBAN - METROPOLITAN AREA CLINIC 107 | Facility: CLINIC | Age: 64
End: 2024-08-07

## 2024-08-16 ENCOUNTER — OFFICE VISIT (OUTPATIENT)
Dept: URBAN - METROPOLITAN AREA CLINIC 107 | Facility: CLINIC | Age: 64
End: 2024-08-16
Payer: COMMERCIAL

## 2024-08-16 VITALS
DIASTOLIC BLOOD PRESSURE: 98 MMHG | TEMPERATURE: 97.2 F | HEIGHT: 69 IN | WEIGHT: 194.2 LBS | OXYGEN SATURATION: 99 % | BODY MASS INDEX: 28.76 KG/M2 | SYSTOLIC BLOOD PRESSURE: 131 MMHG | HEART RATE: 74 BPM

## 2024-08-16 DIAGNOSIS — R19.7 INTERMITTENT DIARRHEA: ICD-10-CM

## 2024-08-16 DIAGNOSIS — R14.3 EXCESSIVE GAS: ICD-10-CM

## 2024-08-16 DIAGNOSIS — K51.00 ULCERATIVE PANCOLITIS: ICD-10-CM

## 2024-08-16 DIAGNOSIS — R14.0 ABDOMINAL BLOATING: ICD-10-CM

## 2024-08-16 PROCEDURE — 99214 OFFICE O/P EST MOD 30 MIN: CPT | Performed by: NURSE PRACTITIONER

## 2024-08-16 RX ORDER — FAMOTIDINE 40 MG/1
1 TABLET 30 MINUTES BEFORE BREAKFAST AND DINNER TABLET, FILM COATED ORAL TWICE A DAY
Qty: 180 TABLET | Refills: 1 | OUTPATIENT
Start: 2024-08-16

## 2024-08-16 RX ORDER — CARVEDILOL 6.25 MG/1
1 TABLET WITH FOOD TABLET, FILM COATED ORAL TWICE A DAY
Status: ACTIVE | COMMUNITY

## 2024-08-16 RX ORDER — ATORVASTATIN CALCIUM 10 MG/1
TAKE 1 TABLET DAILY TABLET, FILM COATED ORAL
Refills: 0 | Status: ACTIVE | COMMUNITY
Start: 2018-02-23

## 2024-08-16 RX ORDER — MESALAMINE 1.2 G/1
TAKE 4 CAPSULES ORALLY ONCE A DAY FOR 90 DAYS TABLET, DELAYED RELEASE ORAL
Qty: 360 TABLET | Refills: 1 | Status: ON HOLD | COMMUNITY

## 2024-08-16 RX ORDER — LISINOPRIL 10 MG/1
TAKE 1 TABLET DAILY TABLET ORAL
Refills: 0 | Status: ACTIVE | COMMUNITY
Start: 2018-02-23

## 2024-08-16 NOTE — HPI-OTHER HISTORIES
Colonoscopy (3/16/2018): [place] bowel preparation scale score of 9.  One 4 mm polyp in the descending colon.  Diverticulosis in the entire examined colon.  Nodular ileal mucosa.  Pathology demonstrated this polyp to be a tubular adenoma.  Terminal ileum biopsies revealed prominent mucosal lymphoid aggregates. EGD (4/20/23). He was found to have LA-A esophagitis and a mild Schatzki's ring. Gastric bx were significant for chronic gastritis and negative for H.Pylori. esophageal bx were negative for BE.  Colonoscopy (July 2023). Random bx were negative and his CSC was normal without any colitis.  Labs 2/18/2024. CBC:Hgb 12.7, HCT 37.4, RBC 4.01. CMP: Glucose 122, alk phos 39. Hemoglobin A1c 6.7.

## 2024-08-16 NOTE — HPI-TODAY'S VISIT:
64-year-old male here for a follow-up appointment.  8/16/2024 Today he is doing better.  Butter causes loose stools so he has cut back. Occasional bloating. Insurance doesn't cover his breath tests.  He has not been on the mesalamine since his colonoscopy in 2023, pills were too big and he was told he didn't have to take it.  On famotidine which has helped his GERD. No dysphagia. Has fomred BM 3 times a day, no melena, hematochezia or mucous.  Weight is stable from his last appointment  Last seen 5/7/2024 for ulcerative pancolitis with excessive gas, intermittent diarrhea, bloating and adenomatous polyps.  Advised to try Pepcid.  Breath test ordered for further evaluation.  Colitis in remission on mesalamine.  Stool studies recommended if diarrhea persist he was advised to call and notify us.  FODMAP diet advised to identify food triggers Benefiber to bulk stools. Occasional loose stools. Has increased gas, both flatulence and belching,  Had 2 BM's yesterday that were formed. Somtimes more. Stools will be oily at times.  No mucous, no hematochezia.  He does think he is lactose intolerant.  No abdominal pain or GERD. Denies dysphagia. Weight is stable from his last appointment.    9/27/23 64 y/o male presenting for f/u. He was last seen in May 2023. He has a hx of GERD and esophagitis. He had a CSC and was found to have colitis. We recommended to repeat his CSC which was done in July 2023. Random bx were negative and his CSC was normal without any colitis. He denies any rectal bleeding or pain. He does have intermittent diarrhea which occurrs with certain foods. It may happen once weekly. He has more good days than bad.   ulcerative pancolitis in remission. GERD symptoms resolved PPI was titrated down to once a day.  5/15/23 64 y/o male presenting for f/u. He was last seen in March 2023. He has a hx of dysphgia/GERD and was taken for an EGD. He was found to have LA-A esophagitis and a mild Schatzki's ring. Gastric bx were significant for chronic gastritis and negative for H.Pylori. esophageal bx were negative for BE.  Colitis on colonoscopy. Recommended to continue steroid taper. Will consider repeating his colonoscopy to demonstrate resolution of his colitis. He did finish steroid taper. He still has some loose stool. No rectal bleeding. He is not on any other medications currently; he could not tolerate swallowing them. He denies any rectal bleeding.   3/20/23 64 y/o male presenting for f/u. He was last seen in Feb 2023 and subsequently had a CSC performed in Feb 2023. He was found to have internal hemorrhoids and diverticula. He also had bleeding mucosa in the entire colon. 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 He was started on mesalamine but could not swallow these pills. He was then started on a short steroid taper. CT chest/abd/pelvis was unremarkable.   He still has diarrhea; which is intermittent. It occurs 1-5 per day. Sometimes it is more. Sometimes it may be 5-8 per day. It is better while on the steroids. He denies any rectal bleeding.   2/8/23 Mr. Wharton is a 63-year-old gentleman with a history of adenomatous colon polyps, hypertension, and hyperlipidemia, presenting to discuss colonoscopy. He was seen in the office in November 2021 for follow-up regarding abdominal bloating, nausea, excess gas and belching suspected to be related to functional dyspepsia.  His symptoms had improved with dietary modification and FDgard.  A colonoscopy was recommended for polyp surveillance in 2023. He had COVID-19 around [place]. Since this time, his bowel habits have changed. He complains of early satiety, stating he becomes full after eating one bite. He has unintentionally lost over 20lb in the last 6-8 weeks. He denies abdominal pain, but states anything he eats is running right through him. He has diarrhea all throughout the day with up to 6 episodes of nocturnal defecation which wakes him from sleep. He denies blood in the stool. At times, he will experience the urge to have a bowel movement without response. He has occasional nausea without vomiting. He had labs two weeks ago with his PCP. He has not had any stool studies or abdominal imaging.

## 2025-02-12 ENCOUNTER — ERX REFILL RESPONSE (OUTPATIENT)
Dept: URBAN - METROPOLITAN AREA CLINIC 72 | Facility: CLINIC | Age: 65
End: 2025-02-12

## 2025-02-12 RX ORDER — FAMOTIDINE 40 MG/1
1 TABLET 30 MINUTES BEFORE BREAKFAST AND DINNER TABLET, FILM COATED ORAL TWICE A DAY
Qty: 180 TABLET | Refills: 1 | OUTPATIENT

## 2025-02-12 RX ORDER — FAMOTIDINE 40 MG/1
1 TABLET 30 MINUTES BEFORE BREAKFAST AND DINNER TWICE A DAY TABLET, FILM COATED ORAL
Qty: 180 TABLET | Refills: 1 | OUTPATIENT

## 2025-02-17 ENCOUNTER — LAB OUTSIDE AN ENCOUNTER (OUTPATIENT)
Dept: URBAN - METROPOLITAN AREA CLINIC 107 | Facility: CLINIC | Age: 65
End: 2025-02-17

## 2025-02-17 ENCOUNTER — OFFICE VISIT (OUTPATIENT)
Dept: URBAN - METROPOLITAN AREA CLINIC 107 | Facility: CLINIC | Age: 65
End: 2025-02-17
Payer: COMMERCIAL

## 2025-02-17 VITALS
WEIGHT: 191 LBS | DIASTOLIC BLOOD PRESSURE: 98 MMHG | HEIGHT: 69 IN | HEART RATE: 59 BPM | SYSTOLIC BLOOD PRESSURE: 167 MMHG | BODY MASS INDEX: 28.29 KG/M2 | TEMPERATURE: 97.7 F

## 2025-02-17 DIAGNOSIS — R63.0 DECREASED APPETITE: ICD-10-CM

## 2025-02-17 DIAGNOSIS — Z86.0101 HISTORY OF ADENOMATOUS POLYP OF COLON: ICD-10-CM

## 2025-02-17 DIAGNOSIS — R14.3 EXCESSIVE GAS: ICD-10-CM

## 2025-02-17 DIAGNOSIS — R14.0 ABDOMINAL BLOATING: ICD-10-CM

## 2025-02-17 DIAGNOSIS — R63.4 UNINTENTIONAL WEIGHT LOSS: ICD-10-CM

## 2025-02-17 DIAGNOSIS — K51.00 ULCERATIVE PANCOLITIS: ICD-10-CM

## 2025-02-17 DIAGNOSIS — R19.7 INTERMITTENT DIARRHEA: ICD-10-CM

## 2025-02-17 PROBLEM — 64379006: Status: ACTIVE | Noted: 2025-02-17

## 2025-02-17 PROCEDURE — 99214 OFFICE O/P EST MOD 30 MIN: CPT | Performed by: NURSE PRACTITIONER

## 2025-02-17 RX ORDER — FAMOTIDINE 40 MG/1
1 TABLET 30 MINUTES BEFORE BREAKFAST AND DINNER TWICE A DAY TABLET, FILM COATED ORAL
Qty: 180 TABLET | Refills: 1 | Status: ACTIVE | COMMUNITY

## 2025-02-17 RX ORDER — MESALAMINE 1.2 G/1
TAKE 4 CAPSULES ORALLY ONCE A DAY FOR 90 DAYS TABLET, DELAYED RELEASE ORAL
Qty: 360 TABLET | Refills: 1 | Status: ON HOLD | COMMUNITY

## 2025-02-17 RX ORDER — ATORVASTATIN CALCIUM 10 MG/1
TAKE 1 TABLET DAILY TABLET, FILM COATED ORAL
Refills: 0 | Status: ACTIVE | COMMUNITY
Start: 2018-02-23

## 2025-02-17 RX ORDER — MEGESTROL ACETATE 20 MG/1
1 TABLET TABLET ORAL ONCE A DAY
Qty: 30 TABLET | Refills: 3 | OUTPATIENT
Start: 2025-02-17 | End: 2025-06-17

## 2025-02-17 RX ORDER — LISINOPRIL 10 MG/1
TAKE 1 TABLET DAILY TABLET ORAL
Refills: 0 | Status: ACTIVE | COMMUNITY
Start: 2018-02-23

## 2025-02-17 RX ORDER — CARVEDILOL 6.25 MG/1
1 TABLET WITH FOOD TABLET, FILM COATED ORAL TWICE A DAY
Status: ACTIVE | COMMUNITY

## 2025-02-17 NOTE — HPI-TODAY'S VISIT:
64-year-old male here for a follow-up appointment.  Last seen 8/16/2024 for excessive gas was given a free of sucrose intolerance test completed bloating persists. Intermittent diarrhea improved continue fiber low FODMAP diet to identify triggers. If diarrhea returns advised notify us we will order stool studies. Started on famotidine 40 mg twice a day consider treatment for SIBO.  Seen 5/7/2024 for ulcerative pancolitis with excessive gas, intermittent diarrhea, bloating and adenomatous polyps.  Advised to try Pepcid.  Breath test ordered for further evaluation.  Colitis in remission on mesalamine.  Stool studies recommended if diarrhea persist he was advised to call and notify us.  FODMAP diet advised to identify food triggers Benefiber to bulk stools. Occasional loose stools. Has increased gas, both flatulence and belching,  Had 2 BM's yesterday that were formed. Sometimes more. Stools will be oily at times.  No mucous, no hematochezia.  He does think he is lactose intolerant.  No abdominal pain or GERD. Denies dysphagia. Weight is stable from his last appointment.    9/27/23 64 y/o male presenting for f/u. He was last seen in May 2023. He has a Hx of GERD and esophagitis. He had a CSC and was found to have colitis. We recommended repeating his CSC which was done in July 2023. Random bx were negative and his CSC was normal without any colitis. He denies any rectal bleeding or pain. He does have intermittent diarrhea which occurs with certain foods. It may happen once weekly. He has more good days than bad.   Ulcerative pancolitis in remission. GERD symptoms resolved PPI was titrated down to once a day.  5/15/23 64 y/o male presenting for f/u. He was last seen in March 2023. He has a Hx of dysphagia/GERD and was taken for an EGD. He was found to have LA-A esophagitis and a mild Schatzki's ring. Gastric bx were significant for chronic gastritis and negative for H.Pylori. Esophageal bx were negative for BE.  Colitis on colonoscopy. Recommended continuing steroid taper. Will consider repeating his colonoscopy to demonstrate resolution of his colitis. He did finish steroid taper. He still has some loose stool. No rectal bleeding. He is not on any other medications currently; he could not tolerate swallowing them. He denies any rectal bleeding.   3/20/23 64 y/o male presenting for f/u. He was last seen in Feb 2023 and subsequently had a CSC performed in Feb 2023. He was found to have internal hemorrhoids and diverticula. He also had bleeding mucosa in the entire colon. 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 He was started on mesalamine but could not swallow these pills. He was then started on a short steroid taper. CT chest/abd/pelvis was unremarkable.   He still has diarrhea; which is intermittent. It occurs 1-5 per day. Sometimes it is more. Sometimes it may be 5-8 per day. It is better while on the steroids. He denies any rectal bleeding.   2/8/23 Mr. Wharton is a 63-year-old gentleman with a history of adenomatous colon polyps, hypertension, and hyperlipidemia, presenting to discuss colonoscopy. He was seen in the office in November 2021 for follow-up regarding abdominal bloating, nausea, excess gas and belching suspected to be related to functional dyspepsia.  His symptoms had improved with dietary modification and FDgard.  A colonoscopy was recommended for polyp surveillance in 2023. He had COVID-19 around [employer]. Since this time, his bowel habits have changed. He complains of early satiety, stating he becomes full after eating one bite. He has unintentionally lost over 20lb in the last 6-8 weeks. He denies abdominal pain, but states anything he eats is running right through him. He has diarrhea all throughout the day with up to 6 episodes of nocturnal defecation which wakes him from sleep. He denies blood in the stool. At times, he will experience the urge to have a bowel movement without response. He has occasional nausea without vomiting. He had labs two weeks ago with his PCP. He has not had any stool studies or abdominal imaging.  8/16/2024 Today he is doing better. Butter causes loose stools so he has cut back. Occasional bloating. Insurance doesn't cover his breath tests. He has not been on the mesalamine since his colonoscopy in 2023, pills were too big, and he was told he didn't have to take it. On famotidine which has helped his GERD. No dysphagia. Has formed BM 3 times a day, no melena, hematochezia or mucous. Weight is stable from his last appointment  Interval history, 2/17/2025 Taking famotidine, still gassy but better. Stomach is growling, but he didn't have breakfast, only eats 2 meals a day.  Decreased appetite, tried smoothies. Feels a little nausea. Has 5 watery/oily BM's a day. If he takes 2 Imodium he will get constipated. No melena or hematochezia.  No abdominal pain. Weight is down 3 pounds.

## 2025-02-17 NOTE — HPI-OTHER HISTORIES
Colonoscopy (3/16/2018): [place] bowel preparation scale score of 9.  One 4 mm polyp in the descending colon.  Diverticulosis in the entire examined colon.  Nodular ileal mucosa.  Pathology demonstrated this polyp to be a tubular adenoma.  Terminal ileum biopsies revealed prominent mucosal lymphoid aggregates. EGD (4/20/23). He was found to have LA-A esophagitis and a mild Schatzki's ring. Gastric bx were significant for chronic gastritis and negative for H.Pylori. esophageal bx were negative for BE.  Colonoscopy (July 2023). Random bx were negative and his CSC was normal without any colitis.  Labs 2/18/2024. CBC:Hgb 12.7, HCT 37.4, RBC 4.01. CMP: Glucose 122, alk phos 39. Hemoglobin A1c 6.7.

## 2025-05-16 ENCOUNTER — OFFICE VISIT (OUTPATIENT)
Dept: URBAN - METROPOLITAN AREA CLINIC 107 | Facility: CLINIC | Age: 65
End: 2025-05-16
Payer: COMMERCIAL

## 2025-05-16 DIAGNOSIS — R63.0 DECREASED APPETITE: ICD-10-CM

## 2025-05-16 DIAGNOSIS — R19.7 INTERMITTENT DIARRHEA: ICD-10-CM

## 2025-05-16 DIAGNOSIS — R63.4 UNINTENTIONAL WEIGHT LOSS: ICD-10-CM

## 2025-05-16 DIAGNOSIS — K76.89 LIVER CYST: ICD-10-CM

## 2025-05-16 DIAGNOSIS — R14.0 ABDOMINAL BLOATING: ICD-10-CM

## 2025-05-16 DIAGNOSIS — R14.3 EXCESSIVE GAS: ICD-10-CM

## 2025-05-16 DIAGNOSIS — Z86.0101 HISTORY OF ADENOMATOUS POLYP OF COLON: ICD-10-CM

## 2025-05-16 DIAGNOSIS — K51.00 ULCERATIVE PANCOLITIS: ICD-10-CM

## 2025-05-16 PROBLEM — 85057007: Status: ACTIVE | Noted: 2025-05-16

## 2025-05-16 PROCEDURE — 99213 OFFICE O/P EST LOW 20 MIN: CPT | Performed by: NURSE PRACTITIONER

## 2025-05-16 RX ORDER — MEGESTROL ACETATE 20 MG/1
1 TABLET TABLET ORAL ONCE A DAY
Qty: 30 TABLET | Refills: 3 | Status: ON HOLD | COMMUNITY
Start: 2025-02-17 | End: 2025-06-17

## 2025-05-16 RX ORDER — MESALAMINE 1.2 G/1
TAKE 4 CAPSULES ORALLY ONCE A DAY FOR 90 DAYS TABLET, DELAYED RELEASE ORAL
Qty: 360 TABLET | Refills: 1 | Status: ON HOLD | COMMUNITY

## 2025-05-16 RX ORDER — FAMOTIDINE 40 MG/1
1 TABLET 30 MINUTES BEFORE BREAKFAST AND DINNER TWICE A DAY TABLET, FILM COATED ORAL
Qty: 180 TABLET | Refills: 1 | Status: ON HOLD | COMMUNITY

## 2025-05-16 RX ORDER — CARVEDILOL 6.25 MG/1
1 TABLET WITH FOOD TABLET, FILM COATED ORAL TWICE A DAY
Status: ACTIVE | COMMUNITY

## 2025-05-16 RX ORDER — LISINOPRIL 10 MG/1
TAKE 1 TABLET DAILY TABLET ORAL
Refills: 0 | Status: ACTIVE | COMMUNITY
Start: 2018-02-23

## 2025-05-16 RX ORDER — ATORVASTATIN CALCIUM 10 MG/1
TAKE 1 TABLET DAILY TABLET, FILM COATED ORAL
Refills: 0 | Status: ACTIVE | COMMUNITY
Start: 2018-02-23

## 2025-05-16 NOTE — HPI-OTHER HISTORIES
Colonoscopy (3/16/2018): [place] bowel preparation scale score of 9.  One 4 mm polyp in the descending colon.  Diverticulosis in the entire examined colon.  Nodular ileal mucosa.  Pathology demonstrated this polyp to be a tubular adenoma.  Terminal ileum biopsies revealed prominent mucosal lymphoid aggregates. EGD (4/20/23). He was found to have LA-A esophagitis and a mild Schatzki's ring. Gastric bx were significant for chronic gastritis and negative for H.Pylori. esophageal bx were negative for BE.  Colonoscopy (July 2023). Random bx were negative and his CSC was normal without any colitis.  Labs 2/18/2024. CBC:Hgb 12.7, HCT 37.4, RBC 4.01. CMP: Glucose 122, alk phos 39. Hemoglobin A1c 6.7.

## 2025-05-16 NOTE — HPI-TODAY'S VISIT:
65-year-old male here for a follow-up appointment.  Last seen 2/17/2025 for decreased appetite, unintentional weight loss. Started on Megace, CT and labs ordered for further evaluation. For excessive gas and bloating famotidine has helped advised to continue. Previously prescribed mesalamine for ulcerative colitis, but he never started. He was reporting loose stools discussed repeating stool studies he declined advised notify me if worse or if he would like to proceed. Consider treatment of IBS-D/SIBO with Xifaxan.  Seen 5/7/2024 for ulcerative pancolitis with excessive gas, intermittent diarrhea, bloating and adenomatous polyps.  Advised to try Pepcid.  Breath test ordered for further evaluation.  Colitis in remission on mesalamine.  Stool studies recommended if diarrhea persist he was advised to call and notify us.  FODMAP diet advised to identify food triggers Benefiber to bulk stools. Occasional loose stools. Has increased gas, both flatulence and belching,  Had 2 BM's yesterday that were formed. Sometimes more. Stools will be oily at times.  No mucous, no hematochezia.  He does think he is lactose intolerant.  No abdominal pain or GERD. Denies dysphagia. Weight is stable from his last appointment.    9/27/23 64 y/o male presenting for f/u. He was last seen in May 2023. He has a Hx of GERD and esophagitis. He had a CSC and was found to have colitis. We recommended repeating his CSC which was done in July 2023. Random bx were negative and his CSC was normal without any colitis. He denies any rectal bleeding or pain. He does have intermittent diarrhea which occurs with certain foods. It may happen once weekly. He has more good days than bad.   Ulcerative pancolitis in remission. GERD symptoms resolved PPI was titrated down to once a day.  5/15/23 64 y/o male presenting for f/u. He was last seen in March 2023. He has a Hx of dysphagia/GERD and was taken for an EGD. He was found to have LA-A esophagitis and a mild Schatzki's ring. Gastric bx were significant for chronic gastritis and negative for H.Pylori. Esophageal bx were negative for BE.  Colitis on colonoscopy. Recommended continuing steroid taper. Will consider repeating his colonoscopy to demonstrate resolution of his colitis. He did finish steroid taper. He still has some loose stool. No rectal bleeding. He is not on any other medications currently; he could not tolerate swallowing them. He denies any rectal bleeding.   3/20/23 64 y/o male presenting for f/u. He was last seen in Feb 2023 and subsequently had a CSC performed in Feb 2023. He was found to have internal hemorrhoids and diverticula. He also had bleeding mucosa in the entire colon. 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 He was started on mesalamine but could not swallow these pills. He was then started on a short steroid taper. CT chest/abd/pelvis was unremarkable.   He still has diarrhea; which is intermittent. It occurs 1-5 per day. Sometimes it is more. Sometimes it may be 5-8 per day. It is better while on the steroids. He denies any rectal bleeding.   2/8/23 Mr. Wharton is a 63-year-old gentleman with a history of adenomatous colon polyps, hypertension, and hyperlipidemia, presenting to discuss colonoscopy. He was seen in the office in November 2021 for follow-up regarding abdominal bloating, nausea, excess gas and belching suspected to be related to functional dyspepsia.  His symptoms had improved with dietary modification and FDgard.  A colonoscopy was recommended for polyp surveillance in 2023. He had COVID-19 around [place]. Since this time, his bowel habits have changed. He complains of early satiety, stating he becomes full after eating one bite. He has unintentionally lost over 20lb in the last 6-8 weeks. He denies abdominal pain, but states anything he eats is running right through him. He has diarrhea all throughout the day with up to 6 episodes of nocturnal defecation which wakes him from sleep. He denies blood in the stool. At times, he will experience the urge to have a bowel movement without response. He has occasional nausea without vomiting. He had labs two weeks ago with his PCP. He has not had any stool studies or abdominal imaging.  8/16/2024 Today he is doing better. Butter causes loose stools so he has cut back. Occasional bloating. Insurance doesn't cover his breath tests. He has not been on the mesalamine since his colonoscopy in 2023, pills were too big, and he was told he didn't have to take it. On famotidine which has helped his GERD. No dysphagia. Has formed BM 3 times a day, no melena, hematochezia or mucous. Weight is stable from his last appointment  Interval history, 2/17/2025 Taking famotidine, still gassy but better. Stomach is growling, but he didn't have breakfast, only eats 2 meals a day.  Decreased appetite, tried smoothies. Feels a little nausea. Has 5 watery/oily BM's a day. If he takes 2 Imodium he will get constipated. No melena or hematochezia.  No abdominal pain. Weight is down 3 pounds.   Interval history, 5/16/2025 Labs 2/27/2025.  CMP:Glucose 124, albumin 5.  Thyroid studies normal.  CBC normal with Hgb 13.7. CT abdomen and pelvis with contrast 3/4/2025.  3.3 cm hepatic cyst.  Colonic diverticulosis and mild gastrointestinal stasis pattern.  Small fat-containing umbilical hernia.  He reports diarrhea improved, appetite returned. He is off famotidine and Megace. He reports 3-4 formed stools, no melena or hematochezia. No GERD or dysphagia.  He watches what he eats.

## 2025-06-14 ENCOUNTER — ERX REFILL RESPONSE (OUTPATIENT)
Dept: URBAN - METROPOLITAN AREA CLINIC 72 | Facility: CLINIC | Age: 65
End: 2025-06-14

## 2025-06-14 RX ORDER — MEGESTROL ACETATE 20 MG/1
TAKE 1 TABLET BY MOUTH EVERY DAY FOR 30 DAYS TABLET ORAL
Qty: 90 TABLET | Refills: 1 | OUTPATIENT

## 2025-06-14 RX ORDER — MEGESTROL ACETATE 20 MG/1
1 TABLET TABLET ORAL ONCE A DAY
Qty: 30 TABLET | Refills: 3 | OUTPATIENT